# Patient Record
Sex: FEMALE | Race: WHITE | URBAN - METROPOLITAN AREA
[De-identification: names, ages, dates, MRNs, and addresses within clinical notes are randomized per-mention and may not be internally consistent; named-entity substitution may affect disease eponyms.]

---
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
## 2024-05-21 ENCOUNTER — HOSPITAL ENCOUNTER (OUTPATIENT)
Age: 19
Discharge: HOME | End: 2024-05-21

## 2024-05-21 VITALS
SYSTOLIC BLOOD PRESSURE: 116 MMHG | TEMPERATURE: 97 F | RESPIRATION RATE: 16 BRPM | OXYGEN SATURATION: 98 % | DIASTOLIC BLOOD PRESSURE: 73 MMHG | HEART RATE: 100 BPM

## 2024-05-21 DIAGNOSIS — H92.02 EARACHE ON LEFT: Primary | ICD-10-CM

## 2024-05-21 PROCEDURE — 99203 OFFICE O/P NEW LOW 30 MIN: CPT | Performed by: PHYSICIAN ASSISTANT

## 2024-05-21 RX ORDER — OFLOXACIN 3 MG/ML
10 SOLUTION AURICULAR (OTIC) DAILY
Qty: 5 ML | Refills: 0 | Status: SHIPPED | OUTPATIENT
Start: 2024-05-21 | End: 2024-05-28

## 2024-05-21 RX ORDER — CLARITHROMYCIN 500 MG/1
500 TABLET, COATED ORAL 2 TIMES DAILY
Qty: 20 TABLET | Refills: 0 | Status: SHIPPED | OUTPATIENT
Start: 2024-05-21 | End: 2024-05-31
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
## 2024-06-10 ENCOUNTER — OFFICE VISIT (OUTPATIENT)
Facility: CLINIC | Age: 19
End: 2024-06-10
Payer: COMMERCIAL

## 2024-06-10 VITALS
WEIGHT: 152 LBS | HEIGHT: 68 IN | DIASTOLIC BLOOD PRESSURE: 78 MMHG | BODY MASS INDEX: 23.04 KG/M2 | SYSTOLIC BLOOD PRESSURE: 110 MMHG

## 2024-06-10 DIAGNOSIS — N92.1 BREAKTHROUGH BLEEDING: Primary | ICD-10-CM

## 2024-06-10 PROBLEM — Z97.5 BREAKTHROUGH BLEEDING WITH NUVARING: Status: ACTIVE | Noted: 2024-06-10

## 2024-06-10 PROCEDURE — 3078F DIAST BP <80 MM HG: CPT | Performed by: OBSTETRICS & GYNECOLOGY

## 2024-06-10 PROCEDURE — 99212 OFFICE O/P EST SF 10 MIN: CPT | Performed by: OBSTETRICS & GYNECOLOGY

## 2024-06-10 PROCEDURE — 3074F SYST BP LT 130 MM HG: CPT | Performed by: OBSTETRICS & GYNECOLOGY

## 2024-06-10 PROCEDURE — 3008F BODY MASS INDEX DOCD: CPT | Performed by: OBSTETRICS & GYNECOLOGY

## 2024-06-10 NOTE — PROGRESS NOTES
Gynecology Office Visit      Lou Montanez is a 19 year old female  Patient's last menstrual period was 2024 (exact date). (contraception:  annovera ring)     HPI:     Chief Complaint   Patient presents with    Vaginal Bleeding     Pt is currently on annovera. Reports she has random episodes of spotting in the past week.       Pt is currently on Annovera. Is a student at [employer]. Has been using it on a continuous basis. Had menses while on it mid may. Removed the ring for a week at that time. Now, 1 month later is having some spotting. Recommended pt take it out for 1 wk.   Prior to that menses in May, pt had left it \" in for months\". Pt instructed to look at her calendar to see how many time she has removed, since it is good for 13 cycles( 3 weeks in and 1 wk out) .  Will see her in Dec for her annual exam.    Chart and previous encounters reviewed.  HISTORY:  No past medical history on file.   Past Surgical History:   Procedure Laterality Date    Hc implant ear tubes        Family History   Problem Relation Age of Onset    No Known Problems Father     No Known Problems Mother     Cancer Neg     Heart Disease Neg     Stroke Neg       Social History:   Social History     Socioeconomic History    Marital status: Single   Tobacco Use    Smoking status: Never    Smokeless tobacco: Never   Vaping Use    Vaping status: Never Used   Substance and Sexual Activity    Alcohol use: Yes     Comment: SOCIAL    Drug use: Never    Sexual activity: Never     Birth control/protection: Inserts     Comment: nuvaring        Medications (Active prior to today's visit):  Current Outpatient Medications   Medication Sig Dispense Refill    Segesterone-Ethinyl Estradiol (ANNOVERA) 0.15-0.013 MG/24HR Vaginal Ring Place 1 ring  vaginally As Directed. Insert 1 ring vaginally. Following insertion ring should remain in place for 21 continuous days (3 weeks), then removed for 7 days (1 week). This pattern (3 weeks  in and 1 week out) is one cycle, one ring provides contraception for 13 cycles 1 each 0       Allergies:  Allergies   Allergen Reactions    Amoxicillin RASH       Gyn:  Menarche: 15  Period Cycle (Days): 90  Period Duration (Days): 3-5  Period Flow: light  Use of Birth Control (if yes, specify type): Other  Date When Birth Control Last Used: Annovera vag insert  Follow Up Recommendation: age 20    OB Hx:  OB History    Para Term  AB Living   0 0 0 0 0 0   SAB IAB Ectopic Multiple Live Births   0 0 0 0 0         ROS:     10 point ROS completed and was negative, except for pertinent positive and negatives stated in the HPI.    PHYSICAL EXAM:   /78   Ht 68\"   Wt 152 lb (68.9 kg)   LMP 2024 (Exact Date)   BMI 23.11 kg/m²      Wt Readings from Last 6 Encounters:   06/10/24 152 lb (68.9 kg)   23 148 lb (67.1 kg) (78%, Z= 0.78)*   22 139 lb (63 kg) (73%, Z= 0.61)*   22 144 lb (65.3 kg) (80%, Z= 0.83)*   21 144 lb (65.3 kg) (81%, Z= 0.87)*   21 134 lb (60.8 kg) (71%, Z= 0.54)*     * Growth percentiles are based on CDC (Girls, 2-20 Years) data.        No exam done today       ASSESSMENT/PLAN:     1. Breakthrough bleeding          Meds This Visit:  Requested Prescriptions      No prescriptions requested or ordered in this encounter       Imaging & Referrals:  None     No follow-ups on file.      Roni Pugh MD  6/10/2024  9:57 AM

## 2025-01-06 ENCOUNTER — OFFICE VISIT (OUTPATIENT)
Facility: CLINIC | Age: 20
End: 2025-01-06
Payer: COMMERCIAL

## 2025-01-06 VITALS
HEIGHT: 68 IN | SYSTOLIC BLOOD PRESSURE: 130 MMHG | BODY MASS INDEX: 23.19 KG/M2 | DIASTOLIC BLOOD PRESSURE: 84 MMHG | WEIGHT: 153 LBS

## 2025-01-06 DIAGNOSIS — Z30.44 ENCOUNTER FOR SURVEILLANCE OF VAGINAL RING HORMONAL CONTRACEPTIVE DEVICE: ICD-10-CM

## 2025-01-06 DIAGNOSIS — Z30.09 COUNSELING FOR BIRTH CONTROL REGARDING INTRAUTERINE DEVICE (IUD): ICD-10-CM

## 2025-01-06 DIAGNOSIS — Z80.49 FAMILY HISTORY OF CERVICAL CANCER: ICD-10-CM

## 2025-01-06 DIAGNOSIS — Z11.3 SCREENING FOR STDS (SEXUALLY TRANSMITTED DISEASES): Primary | ICD-10-CM

## 2025-01-06 DIAGNOSIS — Z01.419 WELL WOMAN EXAM WITH ROUTINE GYNECOLOGICAL EXAM: ICD-10-CM

## 2025-01-06 RX ORDER — MISOPROSTOL 100 UG/1
TABLET ORAL
Qty: 2 TABLET | Refills: 0 | Status: SHIPPED | OUTPATIENT
Start: 2025-01-06

## 2025-01-06 NOTE — PROGRESS NOTES
GYN H&P     Genetic questionnaire reviewed with the patient and she will be referred for genetic counseling if the questionnaire had any positive results.    The [facility] intake form was also reviewed regarding contraception, menstrual periods, urinary health, and vaginal / sexual health    2025  2:26 PM    Chief Complaint   Patient presents with    Physical     Would like to discuss IUD       HPI: Lou is a 19 year old  Patient's last menstrual period was 2024 (approximate).  (contraception: annovera ring) here for her annual gyn exam. Presents with mother.    Has has no complaints.  Skips menses with ring. Happy with current method but wanted to discuss IUD options as she wants something she does not have to worry about. Going back to school this weekend and wondering if she can have it placed before going back. Denies any pelvic or breast complaints.     Her paternal aunt also recently got diagnosed with cervical cancer in her 40s - wondering what her risks are in relation to cervical cancer and when/how often screening should occur.      Previous encounters and chart reviewed.     OB History    Para Term  AB Living   0 0 0 0 0 0   SAB IAB Ectopic Multiple Live Births   0 0 0 0 0       GYN hx:   Menarche: 15  Period Cycle (Days): 90  Period Duration (Days): 3-5  Period Flow: light  Use of Birth Control (if yes, specify type): Other  Date When Birth Control Last Used: Annovera vag insert  Follow Up Recommendation: age 20      History reviewed. No pertinent past medical history.  Past Surgical History:   Procedure Laterality Date    Hc implant ear tubes       Allergies[1]  Medications Ordered Prior to Encounter[2]  Family History   Problem Relation Age of Onset    No Known Problems Father     No Known Problems Mother     Cancer Neg     Heart Disease Neg     Stroke Neg      Social History     Socioeconomic History    Marital status: Single     Spouse name: Not on  file    Number of children: Not on file    Years of education: Not on file    Highest education level: Not on file   Occupational History    Not on file   Tobacco Use    Smoking status: Never    Smokeless tobacco: Never   Vaping Use    Vaping status: Never Used   Substance and Sexual Activity    Alcohol use: Yes     Comment: SOCIAL    Drug use: Never    Sexual activity: Yes     Birth control/protection: Inserts     Comment: annovera   Other Topics Concern    Not on file   Social History Narrative    Not on file     Social Drivers of Health     Financial Resource Strain: Not on file   Food Insecurity: Not on file   Transportation Needs: Not on file   Physical Activity: Not on file   Stress: Not on file   Social Connections: Not on file   Housing Stability: Not on file       ROS:     Review of Systems:  General: denies fevers, chills, fatigue and malaise.   Eyes: no visual changes, denies headaches  ENT: no complaints, denies earaches, runny nose, epistaxis, throat pain or sore throat  Respiratory: denies SOB, dyspnea, cough or wheezing  Cardiovascular: denies chest pain, palpitations, exercise intolerance   GI: denies abdominal pain, diarrhea, constipation  : no complaints, denies dysuria, increased urinary frequency. Menses regular, no dysmenorrhea, no menorrhagia, no dyspareunia   Hematological/lymphatic: denies history of excessive bleeding or bruising, denies dizziness, lightheadedness.   Breast: denies rashes, skin changes, pain, lumps or discharge   Psychiatric: denies depression, changes in sleep patterns, anxiety  Endocrine: denies hot or cold intolerance, mood changes   Neurological: denies changes in sight, smell, hearing or taste. Denies seizures or tremors  Immunological: denies allergies, denies anaphylaxis, or swollen lymph nodes  Musculoskeletal: denies joint pain, morning stiffness, decreased range of motion         O /84   Ht 68\"   Wt 153 lb (69.4 kg)   LMP 11/20/2024 (Approximate)   BMI  23.26 kg/m²         Wt Readings from Last 6 Encounters:   01/06/25 153 lb (69.4 kg)   06/10/24 152 lb (68.9 kg)   12/21/23 148 lb (67.1 kg) (78%, Z= 0.78)*   08/04/22 139 lb (63 kg) (73%, Z= 0.61)*   01/11/22 144 lb (65.3 kg) (80%, Z= 0.83)*   07/20/21 144 lb (65.3 kg) (81%, Z= 0.87)*     * Growth percentiles are based on [facility] (Girls, 2-20 Years) data.     Exam:   GENERAL: well developed, well nourished, in no apparent distress, oriented.  SKIN: no rashes, no suspicious lesions  HEENT: normal  NECK: supple; no thyromegaly, no adenopathy  LUNGS: clear to auscultation  CARDIOVASCULAR: normal S1, S2, RRR  BREASTS: soft, nontender, no palpable masses or nodes, no nipple discharge, no skin changes, no axillary adenopathy  ABDOMEN: no scars,  soft, non distended; non tender, no masses  PELVIC: declined   EXTREMITIES:  non tender without edema        A/P: Patient is 19 year old female with no complaints. Here for well woman exam.            Patient counseled on:    Diet/exercise.      Self Breast Exams     Safe sex practices / and living environment     Vaccines:  Annual Flu          Pap: neg/neg - Year:  n/a, anticipatory guidance provided  GC/Chlamydia:  collected via urine    Meds This Visit:    Requested Prescriptions     Signed Prescriptions Disp Refills    miSOPROStol 100 MCG Oral Tab 2 tablet 0     Sig: One orally in the evening and one in the morning the day of the procedure.       1. Screening for STDs (sexually transmitted diseases)  - Chlamydia/Gc Amplification; Future    2. Well woman exam with routine gynecological exam    3. Encounter for surveillance of vaginal ring hormonal contraceptive device    4. Counseling for birth control regarding intrauterine device (IUD)  - miSOPROStol 100 MCG Oral Tab; One orally in the evening and one in the morning the day of the procedure.  Dispense: 2 tablet; Refill: 0    5. Family history of cervical cancer    Discussed non-hormonal vs hormonal IUD options     Patient was  counseled on the IUD options, including Kyleena, Silvia, Mirena, Liletta & Paragard. Risks, benefits, indications and alternatives, as well as proper use and common side effects for IUDs were reviewed.  Specifically, irregular bleeding patterns and/or amenorrhea in progesterone IUD users, and possibly heavier and more painful menses in Paragard users were emphasized. Counseled on indications for hormonal vs non-hormonal IUDs.    Insertion procedure was described. Insertion done D#1-5 of menses & dependent on discretion of provider cytotec may be indicated night prior to insertion if nulliparous uteri or CSx only hx.  Patiet also instructed to use ibuprofen 600 mg one hour prior to insertion. Risks of uterine perforation, bleeding, infection especially in the first 21 days, IUD migration out of the uterus/expulsion after placement were mentioned.  Pt is aware that IUDs do not prevent STIs. All questions were answered. - pt scheduled for insertion tomorrow prior to going back to school, cytotec sent to be taken prior to insertion    Discussed cervical cancer natural history and relation to HPV infection. Reviewed screening guidelines. Patient has already received HPV vaccine    Return in about 1 week (around 1/13/2025) for IUD.    Edyta Schulz, APRN   1/6/2025  2:26 PM       This note was created by "[employer] voice recognition. Errors in content may be related to improper recognition by the system; efforts to review and correct have been done but errors may still exist. Please contact me with any questions.    Note to patient and family   The 21st Century Cures Act makes medical notes available to patients in the interest of transparency.  However, please be advised that this is a medical document.  It is intended as azrl-is-qxyv communication.  It is written in medical language which may contain abbreviations or verbiage that are technical and unfamiliar.  It may appear blunt or direct.  Medical documents are  intended to carry relevant information, facts as evident, and the clinical opinion of the practitioner.           [1]   Allergies  Allergen Reactions    Amoxicillin RASH   [2]   Current Outpatient Medications on File Prior to Visit   Medication Sig Dispense Refill    Segesterone-Ethinyl Estradiol (ANNOVERA) 0.15-0.013 MG/24HR Vaginal Ring Place 1 ring  vaginally As Directed. Insert 1 ring vaginally. Following insertion ring should remain in place for 21 continuous days (3 weeks), then removed for 7 days (1 week). This pattern (3 weeks in and 1 week out) is one cycle, one ring provides contraception for 13 cycles 1 each 0     No current facility-administered medications on file prior to visit.

## 2025-01-07 ENCOUNTER — OFFICE VISIT (OUTPATIENT)
Facility: CLINIC | Age: 20
End: 2025-01-07
Payer: COMMERCIAL

## 2025-01-07 VITALS
WEIGHT: 153 LBS | BODY MASS INDEX: 23.19 KG/M2 | HEIGHT: 68 IN | DIASTOLIC BLOOD PRESSURE: 88 MMHG | SYSTOLIC BLOOD PRESSURE: 128 MMHG

## 2025-01-07 DIAGNOSIS — Z30.430 ENCOUNTER FOR INSERTION OF INTRAUTERINE CONTRACEPTIVE DEVICE (IUD): ICD-10-CM

## 2025-01-07 DIAGNOSIS — Z32.00 ENCOUNTER FOR PREGNANCY TEST, RESULT UNKNOWN: Primary | ICD-10-CM

## 2025-01-07 PROBLEM — N92.1 BREAKTHROUGH BLEEDING: Status: RESOLVED | Noted: 2024-06-10 | Resolved: 2025-01-07

## 2025-01-07 PROBLEM — Z97.5 IUD (INTRAUTERINE DEVICE) IN PLACE: Status: RESOLVED | Noted: 2025-01-07 | Resolved: 2025-01-07

## 2025-01-07 PROBLEM — Z97.5 IUD (INTRAUTERINE DEVICE) IN PLACE: Status: ACTIVE | Noted: 2025-01-07

## 2025-01-07 LAB
CONTROL LINE PRESENT WITH A CLEAR BACKGROUND (YES/NO): YES YES/NO
KIT LOT #: NORMAL NUMERIC
PREGNANCY TEST, URINE: NEGATIVE

## 2025-01-07 PROCEDURE — 3008F BODY MASS INDEX DOCD: CPT

## 2025-01-07 PROCEDURE — 3074F SYST BP LT 130 MM HG: CPT

## 2025-01-07 PROCEDURE — 58300 INSERT INTRAUTERINE DEVICE: CPT

## 2025-01-07 PROCEDURE — 3079F DIAST BP 80-89 MM HG: CPT

## 2025-01-07 PROCEDURE — 81025 URINE PREGNANCY TEST: CPT

## 2025-01-07 NOTE — PROCEDURES
IUD Insertion     Pregnancy Results: negative from urine test   Birth control method(s) used: annovera vaginal ring    LMP: Patient's last menstrual period was 2024 (approximate).      COUNSELING:    Procedure discussed with the patient in detail including indication, risks, benefits, alternatives and complications.  This included but was not limited to:  Counseling  on the IUD options, including Silvia, Kyleena, Mirena, Liletta & Paragard. Risks, benefits, indications and alternatives, as well as proper use and common side effects for IUDs were reviewed.  Specifically, irregular bleeding patterns, painful periods, lighter or absent periods.  She understands the risk of perforation during placement, migration, and expulsion.  The failure rate of 2-1000.     Insertion procedure was described. Was continuously using annovera vaginal ring for contraception. Cytotec was taken last night and this morning prior to insertion if nulliparous uteri or history of  section.  Patient also instructed to use ibuprofen 600 mg one hour prior to insertion. Risks of uterine perforation, bleeding, infection especially in the first 21 days, IUD migration out of the uterus/expulsion after placement were mentioned.      PROCEDURE:    Bimanual exam was performed before the procedure and an ultrasound was reviewed if one done. Speculum was introduced into the vagina and the area was prepped with betadine if not allergic. The cervix was grasped with single tooth tenaculum.  Sounding of the cavity was done if necessary.  The device was than placed angling along the uterine axis.  The Mirena  IUD was deployed per the IFU to a depth of 8 cm. The string was cut at the appropriate length.  Fluids and instruments were removed from the vagina.   The patient was left supine if orthostatic symptoms occurred. Procedure tolerated well.       POST INSERTION COUNSELING:    Patient was instructed on pelvic rest until the spotting resolves.  She is aware that IUDs do not prevent STIs. She is also aware that she would need to avoid tampon use until her follow up appointment.  All questions were answered.    Follow up 2 weeks    Edyta Schulz, RONY  1/7/2025

## 2025-01-08 ENCOUNTER — MED REC SCAN ONLY (OUTPATIENT)
Facility: CLINIC | Age: 20
End: 2025-01-08

## 2025-07-29 ENCOUNTER — OFFICE VISIT (OUTPATIENT)
Facility: CLINIC | Age: 20
End: 2025-07-29
Payer: COMMERCIAL

## 2025-07-29 VITALS
WEIGHT: 147.19 LBS | HEIGHT: 68 IN | SYSTOLIC BLOOD PRESSURE: 122 MMHG | BODY MASS INDEX: 22.31 KG/M2 | DIASTOLIC BLOOD PRESSURE: 84 MMHG

## 2025-07-29 DIAGNOSIS — Z97.5 IUD (INTRAUTERINE DEVICE) IN PLACE: ICD-10-CM

## 2025-07-29 DIAGNOSIS — N94.10 DYSPAREUNIA IN FEMALE: Primary | ICD-10-CM

## 2025-07-29 DIAGNOSIS — Z30.431 IUD CHECK UP: ICD-10-CM

## 2025-07-29 PROCEDURE — 3079F DIAST BP 80-89 MM HG: CPT

## 2025-07-29 PROCEDURE — 3008F BODY MASS INDEX DOCD: CPT

## 2025-07-29 PROCEDURE — 76830 TRANSVAGINAL US NON-OB: CPT

## 2025-07-29 PROCEDURE — 3074F SYST BP LT 130 MM HG: CPT

## 2025-07-29 PROCEDURE — 99213 OFFICE O/P EST LOW 20 MIN: CPT

## (undated) NOTE — LETTER
[facility] IN [address] [address]  Dept: [phone]  Dept Fax: [phone]      June 22, 2017    Patient: Amadeo Banegas   Date of Visit: 6/22/2017       To Whom It May Concern:    Maribeth Martin was seen and treated

## (undated) NOTE — ED AVS SNAPSHOT
[facility] in [place] [address] [address]    Phone:  [phone]    Fax:  [address]   MRN: [id number]    Department:  [facility] in [place]   Date of Visit:  6/22/2017           Vaishali Try to avoid any contact with the nailbed or the finger.     Discharge References/Attachments     FRACTURE, FINGER, CLOSED (ENGLISH)    NAILS, UNDERSTANDING BLACK-AND-BLUE (ENGLISH)      Disclosure     Insurance plans vary and the physician(s) referred by t Immediate Cares. Follow-up care is at the discretion of that Physician. IF THERE IS ANY CHANGE OR WORSENING OF YOUR CONDITION, CALL YOUR PRIMARY CARE PHYSICIAN AT ONCE OR GO TO THE EMERGENCY DEPARTMENT.     If you have been prescribed any medication(s), - If you don’t have insurance, [facility] has partnered with [facility] to help you get signed up for insurance coverage.   [facility] is a Federal Navigator program that can help with your Affordable Care Act cover

## (undated) NOTE — LETTER
Lou Montanez, :3/11/2005    CONSENT FOR PROCEDURE/SEDATION    1. I authorize the performance upon Lou Montanez  the following: Intrauterine Device Mirena    2. I authorize RONY Rivera (and whomever is designated as the doctor’s assistant), to perform the above-mentioned procedures.    3. If any unforeseen conditions arise during this procedure calling for additional  procedures, operations, or medications (including anesthesia and blood transfusion), I further request and authorize the doctor to do whatever he/she deems advisable in my interest.    4. I consent to the taking and reproduction of any photographs in the course of this procedure for professional purposes.    5. I consent to the administration of such sedation as may be considered necessary or advisable by the physician responsible for this service, with the exception of ______________________________________________________    6. I have been informed by my doctor of the nature and purpose of this procedure sedation, possible alternative methods of treatment, risk involved and possible complications.    7. If I have a Do Not Resuscitate (DNR) order in place, the physician and I (or the individual authorized to consent on my behalf) will discuss and agree as to whether the Do Not Resuscitate (DNR) order will remain in effect or will be discontinued during the performance of the procedure and the applicable recovery period. If the Do Not Resuscitate (DNR) order is discontinued and is to be reinstated following the procedure/recovery period, the physician will determine when the applicable recovery period ends for purposes of reinstating the Do Not Resuscitate (DNR) order.    Signature of Patient:_______________________________________________    Signature of person authorized to consent for patient:  _______________________________________________________________    Relationship to patient:  ____________________________________________    Witness: _________________________________________ Date:___________     Physician Signature: _______________________________ Date:___________